# Patient Record
Sex: MALE | Race: OTHER | HISPANIC OR LATINO | Employment: UNEMPLOYED | URBAN - METROPOLITAN AREA
[De-identification: names, ages, dates, MRNs, and addresses within clinical notes are randomized per-mention and may not be internally consistent; named-entity substitution may affect disease eponyms.]

---

## 2019-07-27 ENCOUNTER — HOSPITAL ENCOUNTER (EMERGENCY)
Facility: HOSPITAL | Age: 24
Discharge: HOME | End: 2019-07-27
Attending: EMERGENCY MEDICINE

## 2019-07-27 VITALS
SYSTOLIC BLOOD PRESSURE: 107 MMHG | HEART RATE: 90 BPM | DIASTOLIC BLOOD PRESSURE: 71 MMHG | OXYGEN SATURATION: 98 % | TEMPERATURE: 100.5 F | RESPIRATION RATE: 18 BRPM | WEIGHT: 158.51 LBS

## 2019-07-27 DIAGNOSIS — J02.9 ACUTE PHARYNGITIS: Primary | ICD-10-CM

## 2019-07-27 PROCEDURE — 99284 EMERGENCY DEPT VISIT MOD MDM: CPT | Performed by: PHYSICIAN ASSISTANT

## 2019-07-27 PROCEDURE — 96372 THER/PROPH/DIAG INJ SC/IM: CPT

## 2019-07-27 PROCEDURE — 99283 EMERGENCY DEPT VISIT LOW MDM: CPT

## 2019-07-27 RX ORDER — ONDANSETRON 4 MG/1
4 TABLET, ORALLY DISINTEGRATING ORAL ONCE
Status: COMPLETED | OUTPATIENT
Start: 2019-07-27 | End: 2019-07-27

## 2019-07-27 RX ORDER — AMOXICILLIN 500 MG/1
500 CAPSULE ORAL EVERY 12 HOURS SCHEDULED
Qty: 20 CAPSULE | Refills: 0 | Status: SHIPPED | OUTPATIENT
Start: 2019-07-27 | End: 2019-07-27 | Stop reason: SDUPTHER

## 2019-07-27 RX ORDER — PREDNISONE 20 MG/1
60 TABLET ORAL ONCE
Status: COMPLETED | OUTPATIENT
Start: 2019-07-27 | End: 2019-07-27

## 2019-07-27 RX ORDER — NAPROXEN 500 MG/1
500 TABLET ORAL 2 TIMES DAILY WITH MEALS
Qty: 20 TABLET | Refills: 0 | Status: SHIPPED | OUTPATIENT
Start: 2019-07-27 | End: 2020-07-26

## 2019-07-27 RX ORDER — AMOXICILLIN 500 MG/1
500 CAPSULE ORAL ONCE
Status: COMPLETED | OUTPATIENT
Start: 2019-07-27 | End: 2019-07-27

## 2019-07-27 RX ORDER — LIDOCAINE HYDROCHLORIDE 20 MG/ML
15 SOLUTION OROPHARYNGEAL ONCE
Status: COMPLETED | OUTPATIENT
Start: 2019-07-27 | End: 2019-07-27

## 2019-07-27 RX ORDER — ACETAMINOPHEN 500 MG
500 TABLET ORAL EVERY 6 HOURS PRN
Qty: 30 TABLET | Refills: 0 | Status: SHIPPED | OUTPATIENT
Start: 2019-07-27

## 2019-07-27 RX ORDER — ACETAMINOPHEN 500 MG
500 TABLET ORAL EVERY 6 HOURS PRN
Qty: 30 TABLET | Refills: 0 | Status: SHIPPED | OUTPATIENT
Start: 2019-07-27 | End: 2019-07-27 | Stop reason: SDUPTHER

## 2019-07-27 RX ORDER — NAPROXEN 500 MG/1
500 TABLET ORAL 2 TIMES DAILY WITH MEALS
Qty: 20 TABLET | Refills: 0 | Status: SHIPPED | OUTPATIENT
Start: 2019-07-27 | End: 2019-07-27 | Stop reason: SDUPTHER

## 2019-07-27 RX ORDER — PREDNISONE 20 MG/1
60 TABLET ORAL DAILY
Qty: 15 TABLET | Refills: 0 | Status: SHIPPED | OUTPATIENT
Start: 2019-07-27 | End: 2019-08-01

## 2019-07-27 RX ORDER — AMOXICILLIN 500 MG/1
500 CAPSULE ORAL EVERY 12 HOURS SCHEDULED
Qty: 20 CAPSULE | Refills: 0 | Status: SHIPPED | OUTPATIENT
Start: 2019-07-27 | End: 2019-08-06

## 2019-07-27 RX ORDER — KETOROLAC TROMETHAMINE 30 MG/ML
15 INJECTION, SOLUTION INTRAMUSCULAR; INTRAVENOUS ONCE
Status: COMPLETED | OUTPATIENT
Start: 2019-07-27 | End: 2019-07-27

## 2019-07-27 RX ORDER — PREDNISONE 20 MG/1
60 TABLET ORAL DAILY
Qty: 15 TABLET | Refills: 0 | Status: SHIPPED | OUTPATIENT
Start: 2019-07-27 | End: 2019-07-27 | Stop reason: SDUPTHER

## 2019-07-27 RX ORDER — ACETAMINOPHEN 325 MG/1
650 TABLET ORAL ONCE
Status: COMPLETED | OUTPATIENT
Start: 2019-07-27 | End: 2019-07-27

## 2019-07-27 RX ADMIN — ACETAMINOPHEN 650 MG: 325 TABLET ORAL at 18:39

## 2019-07-27 RX ADMIN — PREDNISONE 60 MG: 20 TABLET ORAL at 18:39

## 2019-07-27 RX ADMIN — KETOROLAC TROMETHAMINE 15 MG: 30 INJECTION, SOLUTION INTRAMUSCULAR; INTRAVENOUS at 18:38

## 2019-07-27 RX ADMIN — ONDANSETRON 4 MG: 4 TABLET, ORALLY DISINTEGRATING ORAL at 18:40

## 2019-07-27 RX ADMIN — AMOXICILLIN 500 MG: 500 CAPSULE ORAL at 18:40

## 2019-07-27 RX ADMIN — LIDOCAINE HYDROCHLORIDE 15 ML: 20 SOLUTION ORAL; TOPICAL at 18:39

## 2019-07-27 NOTE — ED PROVIDER NOTES
History  Chief Complaint   Patient presents with    Sore Throat     Sore throat started yesterday  Patient took ibuprophen last night and this morning   Headache     Started today  Patient is 77-year-old male who presents today with chief complaint of sore throat, fevers, chills, nausea, nasal congestion rhinorrhea  Patient reports the symptoms began yesterday in worse associated with a general sensation of fatigue and a frontally located headache with no focal neural deficits  Patient denies any positive sick contacts however does have a toddler at home  Patient reports he has not been taking any medications to alleviate his symptoms and has been able to tolerate liquid oral intake however has pain with swallowing solid foods  Patient denies any chest pain, coughing, shortness of breath  Patient denies any abdominal pain however does endorse nausea and 1 episode of vomiting  Patient denies any dysuria or urinary symptoms or flank pain  History provided by:  Patient   used: No    Sore Throat   Location:  Generalized  Quality:  Aching  Severity:  Moderate  Onset quality:  Gradual  Duration:  2 days  Timing:  Constant  Progression:  Unchanged  Chronicity:  New  Relieved by:  None tried  Ineffective treatments:  None tried  Associated symptoms: adenopathy, chills, fever, headaches, postnasal drip, rhinorrhea and trouble swallowing    Associated symptoms: no abdominal pain, no chest pain, no cough, no drooling, no ear discharge, no ear pain, no epistaxis, no eye discharge, no neck stiffness, no night sweats, no plugged ear sensation, no rash, no shortness of breath, no sinus congestion, no stridor and no voice change    Headache   Associated symptoms: drainage, fatigue, fever and sore throat    Associated symptoms: no abdominal pain, no congestion, no cough, no dizziness, no ear pain, no nausea, no neck stiffness, no numbness and no vomiting        None       History reviewed   No pertinent past medical history  No past surgical history on file  History reviewed  No pertinent family history  I have reviewed and agree with the history as documented  Social History     Tobacco Use    Smoking status: Not on file   Substance Use Topics    Alcohol use: Not on file    Drug use: Not on file        Review of Systems   Constitutional: Positive for appetite change, chills, fatigue and fever  Negative for night sweats  HENT: Positive for postnasal drip, rhinorrhea, sore throat and trouble swallowing  Negative for congestion, drooling, ear discharge, ear pain, nosebleeds and voice change  Eyes: Negative for discharge and redness  Respiratory: Negative for cough, chest tightness, shortness of breath and stridor  Cardiovascular: Negative for chest pain and palpitations  Gastrointestinal: Negative for abdominal pain, nausea and vomiting  Genitourinary: Negative for dysuria and hematuria  Musculoskeletal: Negative  Negative for neck stiffness  Skin: Negative for rash  Neurological: Positive for headaches  Negative for dizziness, syncope, light-headedness and numbness  Hematological: Positive for adenopathy  Physical Exam  Physical Exam   Constitutional: He is oriented to person, place, and time  He appears well-developed and well-nourished  HENT:   Head: Normocephalic and atraumatic  Mouth/Throat: Posterior oropharyngeal erythema present  Tonsils are 3+ on the right  Tonsils are 3+ on the left  Tonsillar exudate  Midline uvula  No respiratory distress, patient able to lie down without complaints of difficulty breathing  Patient managing oral secretions without difficulty  Eyes: Pupils are equal, round, and reactive to light  Neck: Normal range of motion  Cardiovascular: Normal rate, regular rhythm and normal heart sounds  Pulmonary/Chest: Effort normal and breath sounds normal    Abdominal: Soft  There is no tenderness  There is no guarding  Lymphadenopathy:     He has no cervical adenopathy  Neurological: He is alert and oriented to person, place, and time  Skin: Skin is warm and dry  Capillary refill takes less than 2 seconds  Psychiatric: He has a normal mood and affect  Nursing note and vitals reviewed        Vital Signs  ED Triage Vitals   Temperature Pulse Respirations Blood Pressure SpO2   07/27/19 1812 07/27/19 1812 07/27/19 1812 07/27/19 1812 07/27/19 1812   100 5 °F (38 1 °C) 90 18 107/71 98 %      Temp Source Heart Rate Source Patient Position - Orthostatic VS BP Location FiO2 (%)   07/27/19 1812 07/27/19 1812 07/27/19 1812 07/27/19 1812 --   Tympanic Monitor Sitting Left arm       Pain Score       07/27/19 1838       8           Vitals:    07/27/19 1812   BP: 107/71   Pulse: 90   Patient Position - Orthostatic VS: Sitting         Visual Acuity      ED Medications  Medications   ondansetron (ZOFRAN-ODT) dispersible tablet 4 mg (has no administration in time range)   amoxicillin (AMOXIL) capsule 500 mg (has no administration in time range)   predniSONE tablet 60 mg (has no administration in time range)   ketorolac (TORADOL) injection 15 mg (15 mg Intramuscular Given 7/27/19 1838)   acetaminophen (TYLENOL) tablet 650 mg (650 mg Oral Given 7/27/19 1839)   Lidocaine Viscous HCl (XYLOCAINE) 2 % mucosal solution 15 mL (15 mL Swish & Spit Given 7/27/19 1839)       Diagnostic Studies  Results Reviewed     None                 No orders to display              Procedures  Procedures       ED Course                               MDM    Disposition  Final diagnoses:   Acute pharyngitis     Time reflects when diagnosis was documented in both MDM as applicable and the Disposition within this note     Time User Action Codes Description Comment    7/27/2019  6:30 PM Radha Mock [J02 [id number]] Acute pharyngitis       ED Disposition     ED Disposition Condition Date/Time Comment    Discharge Good Sat Jul 27, 2019  6:30 PM Myrna Caro discharge to home/self care  Follow-up Information     Follow up With Specialties Details Why Contact Info    Celine Najera MD Family Medicine Schedule an appointment as soon as possible for a visit in 2 days  [address] [place] [address]  [phone]            Current Discharge Medication List      START taking these medications    Details   acetaminophen (TYLENOL) 500 mg tablet Take 1 tablet (500 mg total) by mouth every 6 (six) hours as needed (pain)  Qty: 30 tablet, Refills: 0    Associated Diagnoses: Acute pharyngitis      amoxicillin (AMOXIL) 500 mg capsule Take 1 capsule (500 mg total) by mouth every 12 (twelve) hours for 10 days  Qty: 20 capsule, Refills: 0    Associated Diagnoses: Acute pharyngitis      menthol-cetylpyridinium (CEPACOL) 3 MG lozenge Take 1 lozenge (3 mg total) by mouth as needed (sore throat)  Qty: 60 lozenge, Refills: 0    Associated Diagnoses: Acute pharyngitis      naproxen (EC NAPROSYN) 500 MG EC tablet Take 1 tablet (500 mg total) by mouth 2 (two) times a day with meals  Qty: 20 tablet, Refills: 0    Associated Diagnoses: Acute pharyngitis      predniSONE 20 mg tablet Take 3 tablets (60 mg total) by mouth daily for 5 days  Qty: 15 tablet, Refills: 0    Associated Diagnoses: Acute pharyngitis           No discharge procedures on file      ED Provider  Electronically Signed by           Fermín Horowitz PA-C  07/27/19 0126